# Patient Record
Sex: MALE | Race: BLACK OR AFRICAN AMERICAN | NOT HISPANIC OR LATINO | URBAN - METROPOLITAN AREA
[De-identification: names, ages, dates, MRNs, and addresses within clinical notes are randomized per-mention and may not be internally consistent; named-entity substitution may affect disease eponyms.]

---

## 2023-04-07 ENCOUNTER — OFFICE VISIT (OUTPATIENT)
Dept: PRIMARY CARE | Facility: CLINIC | Age: 43
End: 2023-04-07
Payer: COMMERCIAL

## 2023-04-07 VITALS
SYSTOLIC BLOOD PRESSURE: 132 MMHG | DIASTOLIC BLOOD PRESSURE: 88 MMHG | BODY MASS INDEX: 32.08 KG/M2 | HEART RATE: 79 BPM | RESPIRATION RATE: 12 BRPM | WEIGHT: 258 LBS | HEIGHT: 75 IN | OXYGEN SATURATION: 97 %

## 2023-04-07 DIAGNOSIS — Z70.8 ENCOUNTER FOR SEXUALLY TRANSMITTED DISEASE COUNSELING: Primary | ICD-10-CM

## 2023-04-07 PROBLEM — M25.641 STIFFNESS OF FINGER JOINT OF RIGHT HAND: Status: ACTIVE | Noted: 2023-04-07

## 2023-04-07 PROBLEM — M25.549 PAIN IN MULTIPLE FINGER JOINTS: Status: ACTIVE | Noted: 2023-04-07

## 2023-04-07 PROBLEM — S62.644A CLOSED NONDISPLACED FRACTURE OF PROXIMAL PHALANX OF RIGHT RING FINGER: Status: ACTIVE | Noted: 2023-04-07

## 2023-04-07 PROBLEM — S62.610D: Status: ACTIVE | Noted: 2023-04-07

## 2023-04-07 PROBLEM — R60.0 EDEMA OF HAND: Status: ACTIVE | Noted: 2023-04-07

## 2023-04-07 PROBLEM — S62.642A CLOSED NONDISPLACED FRACTURE OF PROXIMAL PHALANX OF RIGHT MIDDLE FINGER: Status: ACTIVE | Noted: 2023-04-07

## 2023-04-07 PROCEDURE — 99203 OFFICE O/P NEW LOW 30 MIN: CPT | Performed by: INTERNAL MEDICINE

## 2023-04-07 ASSESSMENT — PROMIS GLOBAL HEALTH SCALE
CARRYOUT_SOCIAL_ACTIVITIES: VERY GOOD
RATE_AVERAGE_FATIGUE: MILD
EMOTIONAL_PROBLEMS: RARELY
CARRYOUT_PHYSICAL_ACTIVITIES: COMPLETELY
RATE_MENTAL_HEALTH: VERY GOOD
RATE_AVERAGE_PAIN: 0
RATE_PHYSICAL_HEALTH: GOOD
RATE_QUALITY_OF_LIFE: GOOD
RATE_SOCIAL_SATISFACTION: VERY GOOD
RATE_GENERAL_HEALTH: GOOD

## 2023-04-07 NOTE — PROGRESS NOTES
"Subjective   Chief complaint: Marcelino Thomas is a 43 y.o. male who presents for New Patient Visit (Npv is here today to establish care. ).    HPI:  Patient would like to be checked for STDs and have a complete physical he does not have any complaint        Objective   /88 (BP Location: Right arm, Patient Position: Sitting, BP Cuff Size: Large adult)   Pulse 79   Resp 12   Ht 1.905 m (6' 3\")   Wt 117 kg (258 lb)   SpO2 97%   BMI 32.25 kg/m²   Physical Exam  Vitals reviewed.   Constitutional:       Appearance: Normal appearance.   HENT:      Head: Normocephalic and atraumatic.   Cardiovascular:      Rate and Rhythm: Normal rate and regular rhythm.   Pulmonary:      Effort: Pulmonary effort is normal.      Breath sounds: Normal breath sounds.   Abdominal:      General: Bowel sounds are normal.      Palpations: Abdomen is soft.   Musculoskeletal:      Cervical back: Neck supple.   Skin:     General: Skin is warm and dry.   Neurological:      General: No focal deficit present.      Mental Status: He is alert.   Psychiatric:         Mood and Affect: Mood normal.         Behavior: Behavior is cooperative.         I have reviewed and reconciled the medication list with the patient today. No current outpatient medications on file.     Imaging:  No results found.     Labs reviewed:    No results found for: WBC, HGB, HCT, PLT, CHOL, TRIG, HDL, LDLDIRECT, ALT, AST, NA, K, CL, CREATININE, BUN, CO2, TSH, PSA, INR, GLUF, HGBA1C, ALBUR    Assessment/Plan   Problem List Items Addressed This Visit    None      Continue current medications as listed  Follow up in 4 weeks for complete physical     "

## 2024-02-06 ENCOUNTER — TELEPHONE (OUTPATIENT)
Dept: PRIMARY CARE | Facility: CLINIC | Age: 44
End: 2024-02-06
Payer: COMMERCIAL

## 2024-02-06 DIAGNOSIS — Z00.00 PHYSICAL EXAM: Primary | ICD-10-CM

## 2024-02-07 ENCOUNTER — LAB (OUTPATIENT)
Dept: LAB | Facility: LAB | Age: 44
End: 2024-02-07
Payer: COMMERCIAL

## 2024-02-07 DIAGNOSIS — Z00.00 PHYSICAL EXAM: ICD-10-CM

## 2024-02-07 LAB
25(OH)D3 SERPL-MCNC: 8 NG/ML (ref 30–100)
ALBUMIN SERPL BCP-MCNC: 4.2 G/DL (ref 3.4–5)
ALP SERPL-CCNC: 75 U/L (ref 33–120)
ALT SERPL W P-5'-P-CCNC: 27 U/L (ref 10–52)
ANION GAP SERPL CALC-SCNC: 11 MMOL/L (ref 10–20)
AST SERPL W P-5'-P-CCNC: 22 U/L (ref 9–39)
BILIRUB SERPL-MCNC: 0.3 MG/DL (ref 0–1.2)
BUN SERPL-MCNC: 17 MG/DL (ref 6–23)
CALCIUM SERPL-MCNC: 10 MG/DL (ref 8.6–10.6)
CHLORIDE SERPL-SCNC: 106 MMOL/L (ref 98–107)
CHOLEST SERPL-MCNC: 174 MG/DL (ref 0–199)
CHOLESTEROL/HDL RATIO: 4.2
CO2 SERPL-SCNC: 30 MMOL/L (ref 21–32)
CREAT SERPL-MCNC: 1.79 MG/DL (ref 0.5–1.3)
EGFRCR SERPLBLD CKD-EPI 2021: 48 ML/MIN/1.73M*2
ERYTHROCYTE [DISTWIDTH] IN BLOOD BY AUTOMATED COUNT: 13.8 % (ref 11.5–14.5)
EST. AVERAGE GLUCOSE BLD GHB EST-MCNC: 123 MG/DL
GLUCOSE SERPL-MCNC: 94 MG/DL (ref 74–99)
HBA1C MFR BLD: 5.9 %
HCT VFR BLD AUTO: 44.3 % (ref 41–52)
HDLC SERPL-MCNC: 41.2 MG/DL
HGB BLD-MCNC: 13.7 G/DL (ref 13.5–17.5)
LDLC SERPL CALC-MCNC: 120 MG/DL
MCH RBC QN AUTO: 25.9 PG (ref 26–34)
MCHC RBC AUTO-ENTMCNC: 30.9 G/DL (ref 32–36)
MCV RBC AUTO: 84 FL (ref 80–100)
NON HDL CHOLESTEROL: 133 MG/DL (ref 0–149)
NRBC BLD-RTO: 0 /100 WBCS (ref 0–0)
PLATELET # BLD AUTO: 287 X10*3/UL (ref 150–450)
POTASSIUM SERPL-SCNC: 4.7 MMOL/L (ref 3.5–5.3)
PROT SERPL-MCNC: 7.3 G/DL (ref 6.4–8.2)
RBC # BLD AUTO: 5.29 X10*6/UL (ref 4.5–5.9)
SODIUM SERPL-SCNC: 142 MMOL/L (ref 136–145)
TRIGL SERPL-MCNC: 62 MG/DL (ref 0–149)
TSH SERPL-ACNC: 1.42 MIU/L (ref 0.44–3.98)
VLDL: 12 MG/DL (ref 0–40)
WBC # BLD AUTO: 4.1 X10*3/UL (ref 4.4–11.3)

## 2024-02-07 PROCEDURE — 83036 HEMOGLOBIN GLYCOSYLATED A1C: CPT

## 2024-02-07 PROCEDURE — 36415 COLL VENOUS BLD VENIPUNCTURE: CPT

## 2024-02-07 PROCEDURE — 84443 ASSAY THYROID STIM HORMONE: CPT

## 2024-02-07 PROCEDURE — 80061 LIPID PANEL: CPT

## 2024-02-07 PROCEDURE — 82306 VITAMIN D 25 HYDROXY: CPT

## 2024-02-07 PROCEDURE — 80053 COMPREHEN METABOLIC PANEL: CPT

## 2024-02-07 PROCEDURE — 85027 COMPLETE CBC AUTOMATED: CPT

## 2024-02-08 ENCOUNTER — APPOINTMENT (OUTPATIENT)
Dept: PRIMARY CARE | Facility: CLINIC | Age: 44
End: 2024-02-08
Payer: COMMERCIAL

## 2024-02-14 ENCOUNTER — OFFICE VISIT (OUTPATIENT)
Dept: PRIMARY CARE | Facility: CLINIC | Age: 44
End: 2024-02-14
Payer: COMMERCIAL

## 2024-02-14 VITALS
RESPIRATION RATE: 12 BRPM | WEIGHT: 269 LBS | DIASTOLIC BLOOD PRESSURE: 94 MMHG | SYSTOLIC BLOOD PRESSURE: 148 MMHG | OXYGEN SATURATION: 96 % | BODY MASS INDEX: 33.62 KG/M2 | HEART RATE: 97 BPM

## 2024-02-14 DIAGNOSIS — E55.9 VITAMIN D DEFICIENCY: ICD-10-CM

## 2024-02-14 DIAGNOSIS — M17.11 PRIMARY OSTEOARTHRITIS OF RIGHT KNEE: Primary | ICD-10-CM

## 2024-02-14 DIAGNOSIS — R94.31 ABNORMAL EKG: ICD-10-CM

## 2024-02-14 DIAGNOSIS — G44.229 CHRONIC TENSION-TYPE HEADACHE, NOT INTRACTABLE: ICD-10-CM

## 2024-02-14 DIAGNOSIS — E78.5 DYSLIPIDEMIA: ICD-10-CM

## 2024-02-14 DIAGNOSIS — Z00.00 ENCOUNTER FOR ANNUAL PHYSICAL EXAM: ICD-10-CM

## 2024-02-14 DIAGNOSIS — N18.32 STAGE 3B CHRONIC KIDNEY DISEASE (MULTI): ICD-10-CM

## 2024-02-14 DIAGNOSIS — R03.0 BORDERLINE BLOOD PRESSURE: ICD-10-CM

## 2024-02-14 PROBLEM — M25.641 STIFFNESS OF FINGER JOINT OF RIGHT HAND: Status: RESOLVED | Noted: 2023-04-07 | Resolved: 2024-02-14

## 2024-02-14 PROBLEM — R60.0 EDEMA OF HAND: Status: RESOLVED | Noted: 2023-04-07 | Resolved: 2024-02-14

## 2024-02-14 PROBLEM — H91.90 HEARING IMPAIRMENT: Status: ACTIVE | Noted: 2024-02-14

## 2024-02-14 PROBLEM — S62.644A CLOSED NONDISPLACED FRACTURE OF PROXIMAL PHALANX OF RIGHT RING FINGER: Status: RESOLVED | Noted: 2023-04-07 | Resolved: 2024-02-14

## 2024-02-14 PROBLEM — S62.642A CLOSED NONDISPLACED FRACTURE OF PROXIMAL PHALANX OF RIGHT MIDDLE FINGER: Status: RESOLVED | Noted: 2023-04-07 | Resolved: 2024-02-14

## 2024-02-14 PROBLEM — A59.9 TRICHOMONIASIS: Status: ACTIVE | Noted: 2024-02-14

## 2024-02-14 PROBLEM — H61.20 EXCESSIVE CERUMEN IN EAR CANAL: Status: ACTIVE | Noted: 2024-02-14

## 2024-02-14 PROBLEM — S62.610D: Status: RESOLVED | Noted: 2023-04-07 | Resolved: 2024-02-14

## 2024-02-14 PROBLEM — A59.9 TRICHOMONIASIS: Status: RESOLVED | Noted: 2024-02-14 | Resolved: 2024-02-14

## 2024-02-14 PROBLEM — L72.9 CYST OF SKIN: Status: ACTIVE | Noted: 2024-02-14

## 2024-02-14 PROCEDURE — 93000 ELECTROCARDIOGRAM COMPLETE: CPT | Performed by: INTERNAL MEDICINE

## 2024-02-14 PROCEDURE — 99396 PREV VISIT EST AGE 40-64: CPT | Performed by: INTERNAL MEDICINE

## 2024-02-14 PROCEDURE — 99214 OFFICE O/P EST MOD 30 MIN: CPT | Performed by: INTERNAL MEDICINE

## 2024-02-14 RX ORDER — IBUPROFEN 800 MG/1
800 TABLET ORAL 3 TIMES DAILY PRN
COMMUNITY
Start: 2023-12-20 | End: 2024-02-14 | Stop reason: SINTOL

## 2024-02-14 RX ORDER — ACETAMINOPHEN 325 MG/1
650 TABLET ORAL EVERY 6 HOURS PRN
COMMUNITY

## 2024-02-14 RX ORDER — OMEGA-3S/DHA/EPA/FISH OIL/D3 300MG-1000
CAPSULE ORAL 3 TIMES DAILY
COMMUNITY
End: 2024-02-26 | Stop reason: SDUPTHER

## 2024-02-14 RX ORDER — ERGOCALCIFEROL 1.25 MG/1
50000 CAPSULE ORAL
COMMUNITY
End: 2024-02-26 | Stop reason: SDUPTHER

## 2024-02-14 ASSESSMENT — PROMIS GLOBAL HEALTH SCALE
EMOTIONAL_PROBLEMS: RARELY
CARRYOUT_PHYSICAL_ACTIVITIES: COMPLETELY
RATE_GENERAL_HEALTH: GOOD
CARRYOUT_SOCIAL_ACTIVITIES: VERY GOOD
RATE_AVERAGE_PAIN: 5
RATE_PHYSICAL_HEALTH: GOOD
RATE_AVERAGE_FATIGUE: MODERATE
RATE_QUALITY_OF_LIFE: VERY GOOD
RATE_MENTAL_HEALTH: VERY GOOD
RATE_SOCIAL_SATISFACTION: VERY GOOD

## 2024-02-14 ASSESSMENT — ENCOUNTER SYMPTOMS
OCCASIONAL FEELINGS OF UNSTEADINESS: 0
LOSS OF SENSATION IN FEET: 1
DEPRESSION: 0

## 2024-02-14 ASSESSMENT — PATIENT HEALTH QUESTIONNAIRE - PHQ9
1. LITTLE INTEREST OR PLEASURE IN DOING THINGS: NOT AT ALL
2. FEELING DOWN, DEPRESSED OR HOPELESS: NOT AT ALL
SUM OF ALL RESPONSES TO PHQ9 QUESTIONS 1 AND 2: 0

## 2024-02-14 NOTE — ASSESSMENT & PLAN NOTE
Chol 174, .   Discussed dietary changes.   Discussed taking omega 3 with meals.     Follow-up in 6 mo.

## 2024-02-14 NOTE — ASSESSMENT & PLAN NOTE
Discussed lab results.   Discussed EKG ordered echo for further evaluation of left axis deviation.   Encouraged vision testing.   Consulted on healthy diet and active lifestyle.

## 2024-02-14 NOTE — ASSESSMENT & PLAN NOTE
/94. Retake 128/82.   Control kidney function. Monitor BP 2 times daily at home, bring log in two weeks.     Check in office in 2 wks.

## 2024-02-14 NOTE — ASSESSMENT & PLAN NOTE
Kidney US for further evaluation. No labs for comparison.   Discontinue ibuprofen products. Drink plenty of water. Patient understands.     Follow-up in 2 wks for BMP

## 2024-02-14 NOTE — PROGRESS NOTES
ANNUAL WELLNESS VISIT    Subjective :  Chief Complaint: Marcelino Thomas is an 43 y.o. male here for an annual wellness visit and general medical care and f/u.     HPI:  Marcelino is here for annual physical.     Headaches have been happening for about 1 yr. The past 2 wks every day. He reports they get better with adequate water intake and when he is eating healthier. He takes ibuprofen 800mg 3 times weekly for them.   Denies aura, denies sudden onset.     Endorses SOB with exertion lately. No CP, SOB at rest.     Needs vision check.     Follows regularly with dental.     Cigar smoker, but not currently.   2 drinks /week        Objective   BP (!) 148/94   Pulse 97   Resp 12   Wt 122 kg (269 lb)   SpO2 96%   BMI 33.62 kg/m²     Physical Exam  Vitals reviewed.   Constitutional:       Appearance: Normal appearance.   Cardiovascular:      Rate and Rhythm: Normal rate and regular rhythm.   Pulmonary:      Effort: Pulmonary effort is normal.      Breath sounds: Normal breath sounds.   Abdominal:      General: Abdomen is flat. Bowel sounds are normal.      Palpations: Abdomen is soft.   Neurological:      Mental Status: He is alert and oriented to person, place, and time.   Psychiatric:         Mood and Affect: Mood normal.         Behavior: Behavior normal.         Thought Content: Thought content normal.         Judgment: Judgment normal.         Imaging:  No results found.     Labs reviewed:    Lab Results   Component Value Date    WBC 4.1 (L) 02/07/2024    HGB 13.7 02/07/2024    HCT 44.3 02/07/2024     02/07/2024    CHOL 174 02/07/2024    TRIG 62 02/07/2024    HDL 41.2 02/07/2024    ALT 27 02/07/2024    AST 22 02/07/2024     02/07/2024    K 4.7 02/07/2024     02/07/2024    CREATININE 1.79 (H) 02/07/2024    BUN 17 02/07/2024    CO2 30 02/07/2024    TSH 1.42 02/07/2024    HGBA1C 5.9 (H) 02/07/2024       Past Medical, Surgical, and Family History reviewed and updated in chart.    I have reviewed and  reconciled the medication list with the patient today.   Current Outpatient Medications:     ergocalciferol (Vitamin D-2) 1.25 MG (95696 UT) capsule, Take 1 capsule (50,000 Units) by mouth 1 (one) time per week., Disp: , Rfl:     omega-3s-dha-epa-fish oil 1,000-1,400 mg capsule, Take by mouth 3 times a day. Take with each meal, Disp: , Rfl:     acetaminophen (Tylenol) 325 mg tablet, Take 2 tablets (650 mg) by mouth every 6 hours if needed for mild pain (1 - 3)., Disp: , Rfl:      List of current healthcare providers:  Patient Care Team:  Manuel Henderson MD as PCP - General (Internal Medicine)  Manuel Henderson MD as PCP - [facility] PCP     HRA:  Over the past 2 weeks, how often have you been bothered by any of the following problems?  Little interest or pleasure in doing things: Not at all  Feeling down, depressed, or hopeless: Not at all  Patient Health Questionnaire-2 Score: 0    Steadi Fall Risk  One or more falls in the last year? No  How many Times?    Was the patient injured in the fall?    Has trouble stepping onto curb? No  Advised to use a cane or walker to get around safely? No  Often has to rush to toilet? No  Feels unsteady when walking? No  Has lost some feeling in feet? Yes  Often feels sad or depressed? No  Steadies self on furniture while walking at home? No  Takes medicine that makes them feel lightheaded or more tired than usual? No  Worried about Falling? No  Takes medicine to sleep or improve mood? No  Needs to push with hands when rising from a chair? No                                          Assessment/Plan :  Problem List Items Addressed This Visit       Encounter for annual physical exam     Discussed lab results.   Discussed EKG ordered echo for further evaluation of left axis deviation.   Encouraged vision testing.   Consulted on healthy diet and active lifestyle.          Relevant Orders    ECG 12 lead (Clinic Performed)    Primary osteoarthritis of right knee - Primary     No ibuprofen  products. Tylenol as needed for pain.          Abnormal EKG    Relevant Orders    Transthoracic Echo (TTE) Complete    Creatinine elevation     Creatinine 1.79, eGFR 48         Relevant Orders    US renal complete    Stage 3b chronic kidney disease (CMS/HCC)     Kidney US for further evaluation. No labs for comparison.   Discontinue ibuprofen products. Drink plenty of water. Patient understands.     Follow-up in 2 wks for BMP         Chronic tension-type headache, not intractable     Stop taking ibuprofen. Drink plenty of water and getting adequate rest.          Borderline blood pressure     /94. Retake 128/82.   Control kidney function. Monitor BP 2 times daily at home, bring log in two weeks.     Check in office in 2 wks.          Dyslipidemia     Chol 174, .   Discussed dietary changes.   Discussed taking omega 3 with meals.     Follow-up in 6 mo.          Vitamin D deficiency     Level 8.   Start Vitamin D 50,000 units weekly.     Recheck in 3 mo.           The following health maintenance schedule was reviewed with the patient and provided in printed form in the after visit summary:  Health Maintenance   Topic Date Due    Hepatitis B Vaccines (1 of 3 - 3-dose series) Never done    COVID-19 Vaccine (1) Never done    MMR Vaccines (1 of 1 - Standard series) Never done    Pneumococcal Vaccine: Pediatrics (0 to 5 Years) and At-Risk Patients (6 to 64 Years) (1 - PCV) Never done    Hepatitis C Screening  Never done    DTaP/Tdap/Td Vaccines (1 - Tdap) Never done    Influenza Vaccine (1) 06/30/2024 (Originally 9/1/2023)    Diabetes: Hemoglobin A1C  02/07/2025    Yearly Adult Physical  02/15/2025    Lipid Panel  02/07/2029    Zoster Vaccines (1 of 2) 02/22/2030    HIV Screening  Completed    HIB Vaccines  Aged Out    IPV Vaccines  Aged Out    Hepatitis A Vaccines  Aged Out    Meningococcal Vaccine  Aged Out    Rotavirus Vaccines  Aged Out    HPV Vaccines  Aged Out       Advance Care Planning   No              Orders Placed This Encounter   Procedures    US renal complete     Standing Status:   Future     Standing Expiration Date:   2/14/2025     Order Specific Question:   Reason for exam:     Answer:   elevated creatine     Order Specific Question:   Radiologist to Determine Optimal Study     Answer:   Yes     Order Specific Question:   [facility] to [facility]     Answer:   Immediate [1]     Order Specific Question:   Is this exam part of a Research Study? If Yes, link this order to the research study     Answer:   No    ECG 12 lead (Clinic Performed)    Transthoracic Echo (TTE) Complete     Standing Status:   Future     Standing Expiration Date:   2/14/2025     Order Specific Question:   Reason for exam:     Answer:   abnormal EKG     Order Specific Question:   Possible 3D echo?     Answer:   No     Order Specific Question:   Possible strain echo?     Answer:   No     Order Specific Question:   Where is your preferred location to perform this study?     Answer:   First Available     Order Specific Question:   Is this procedure part of a Research Study? If Yes, link this order to the research study     Answer:   No       Continue current medications as listed  Follow up in 2 wks. BMP and BP check.  Follow-up in 3 mo for vitamin D and CMP

## 2024-02-15 DIAGNOSIS — R03.0 BORDERLINE BLOOD PRESSURE: ICD-10-CM

## 2024-02-15 DIAGNOSIS — N18.32 STAGE 3B CHRONIC KIDNEY DISEASE (MULTI): ICD-10-CM

## 2024-02-23 ENCOUNTER — LAB (OUTPATIENT)
Dept: LAB | Facility: LAB | Age: 44
End: 2024-02-23
Payer: COMMERCIAL

## 2024-02-23 ENCOUNTER — HOSPITAL ENCOUNTER (OUTPATIENT)
Dept: RADIOLOGY | Facility: HOSPITAL | Age: 44
Discharge: HOME | End: 2024-02-23
Payer: COMMERCIAL

## 2024-02-23 DIAGNOSIS — N18.32 STAGE 3B CHRONIC KIDNEY DISEASE (MULTI): ICD-10-CM

## 2024-02-23 DIAGNOSIS — R03.0 BORDERLINE BLOOD PRESSURE: ICD-10-CM

## 2024-02-23 LAB
ANION GAP SERPL CALC-SCNC: 13 MMOL/L (ref 10–20)
BUN SERPL-MCNC: 17 MG/DL (ref 6–23)
CALCIUM SERPL-MCNC: 10.1 MG/DL (ref 8.6–10.6)
CHLORIDE SERPL-SCNC: 107 MMOL/L (ref 98–107)
CO2 SERPL-SCNC: 27 MMOL/L (ref 21–32)
CREAT SERPL-MCNC: 1.64 MG/DL (ref 0.5–1.3)
EGFRCR SERPLBLD CKD-EPI 2021: 53 ML/MIN/1.73M*2
GLUCOSE SERPL-MCNC: 99 MG/DL (ref 74–99)
POTASSIUM SERPL-SCNC: 4.2 MMOL/L (ref 3.5–5.3)
SODIUM SERPL-SCNC: 143 MMOL/L (ref 136–145)

## 2024-02-23 PROCEDURE — 76770 US EXAM ABDO BACK WALL COMP: CPT

## 2024-02-23 PROCEDURE — 76770 US EXAM ABDO BACK WALL COMP: CPT | Performed by: RADIOLOGY

## 2024-02-23 PROCEDURE — 36415 COLL VENOUS BLD VENIPUNCTURE: CPT

## 2024-02-23 PROCEDURE — 80048 BASIC METABOLIC PNL TOTAL CA: CPT

## 2024-02-26 ENCOUNTER — OFFICE VISIT (OUTPATIENT)
Dept: PRIMARY CARE | Facility: CLINIC | Age: 44
End: 2024-02-26
Payer: COMMERCIAL

## 2024-02-26 VITALS
SYSTOLIC BLOOD PRESSURE: 126 MMHG | HEART RATE: 104 BPM | DIASTOLIC BLOOD PRESSURE: 84 MMHG | OXYGEN SATURATION: 95 % | WEIGHT: 269 LBS | RESPIRATION RATE: 12 BRPM | HEIGHT: 75 IN | BODY MASS INDEX: 33.45 KG/M2

## 2024-02-26 DIAGNOSIS — M17.11 PRIMARY OSTEOARTHRITIS OF RIGHT KNEE: ICD-10-CM

## 2024-02-26 DIAGNOSIS — E78.5 DYSLIPIDEMIA: ICD-10-CM

## 2024-02-26 DIAGNOSIS — N18.32 STAGE 3B CHRONIC KIDNEY DISEASE (MULTI): Primary | ICD-10-CM

## 2024-02-26 DIAGNOSIS — L72.9 CYST OF SKIN: ICD-10-CM

## 2024-02-26 DIAGNOSIS — E55.9 VITAMIN D DEFICIENCY: ICD-10-CM

## 2024-02-26 DIAGNOSIS — N18.32 STAGE 3B CHRONIC KIDNEY DISEASE (MULTI): ICD-10-CM

## 2024-02-26 DIAGNOSIS — Z00.00 ENCOUNTER FOR ANNUAL PHYSICAL EXAM: ICD-10-CM

## 2024-02-26 DIAGNOSIS — G44.229 CHRONIC TENSION-TYPE HEADACHE, NOT INTRACTABLE: ICD-10-CM

## 2024-02-26 LAB
POC APPEARANCE, URINE: CLEAR
POC BILIRUBIN, URINE: NEGATIVE
POC BLOOD, URINE: NEGATIVE
POC COLOR, URINE: ABNORMAL
POC GLUCOSE, URINE: NEGATIVE MG/DL
POC KETONES, URINE: NEGATIVE MG/DL
POC LEUKOCYTES, URINE: NEGATIVE
POC NITRITE,URINE: NEGATIVE
POC PH, URINE: 5 PH
POC PROTEIN, URINE: NEGATIVE MG/DL
POC SPECIFIC GRAVITY, URINE: 1.02
POC UROBILINOGEN, URINE: 0.2 EU/DL

## 2024-02-26 PROCEDURE — 81002 URINALYSIS NONAUTO W/O SCOPE: CPT | Performed by: INTERNAL MEDICINE

## 2024-02-26 PROCEDURE — 93000 ELECTROCARDIOGRAM COMPLETE: CPT | Performed by: INTERNAL MEDICINE

## 2024-02-26 PROCEDURE — 99214 OFFICE O/P EST MOD 30 MIN: CPT | Performed by: INTERNAL MEDICINE

## 2024-02-26 PROCEDURE — 99396 PREV VISIT EST AGE 40-64: CPT | Performed by: INTERNAL MEDICINE

## 2024-02-26 RX ORDER — OMEGA-3S/DHA/EPA/FISH OIL/D3 300MG-1000
1 CAPSULE ORAL 3 TIMES DAILY
Qty: 90 CAPSULE | Refills: 3 | Status: SHIPPED | OUTPATIENT
Start: 2024-02-26

## 2024-02-26 RX ORDER — ERGOCALCIFEROL 1.25 MG/1
50000 CAPSULE ORAL
Qty: 12 CAPSULE | Refills: 3 | Status: SHIPPED | OUTPATIENT
Start: 2024-02-26

## 2024-02-26 ASSESSMENT — ENCOUNTER SYMPTOMS
DEPRESSION: 0
OCCASIONAL FEELINGS OF UNSTEADINESS: 0
LOSS OF SENSATION IN FEET: 0

## 2024-02-26 ASSESSMENT — PATIENT HEALTH QUESTIONNAIRE - PHQ9
SUM OF ALL RESPONSES TO PHQ9 QUESTIONS 1 AND 2: 0
1. LITTLE INTEREST OR PLEASURE IN DOING THINGS: NOT AT ALL
2. FEELING DOWN, DEPRESSED OR HOPELESS: NOT AT ALL

## 2024-02-26 NOTE — ASSESSMENT & PLAN NOTE
No ibuprofen products. Tylenol as needed for pain. No ibuprofen products. Tylenol as needed for pain.

## 2024-02-26 NOTE — ASSESSMENT & PLAN NOTE
Chol 174, .   Discussed dietary changes.   Discussed taking omega 3 with meals.   Follow-up in 3mo. check lipid profile

## 2024-02-26 NOTE — PROGRESS NOTES
"ANNUAL WELLNESS VISIT    Subjective :  Chief Complaint: Marcelino Thomas is an 44 y.o. male here for an annual wellness visit and general medical care and f/u.     HPI:  Annual wellness exam        Objective   /84   Pulse 104   Resp 12   Ht 1.905 m (6' 3\")   Wt 122 kg (269 lb)   SpO2 95%   BMI 33.62 kg/m²     Physical Exam  Vitals reviewed.   Constitutional:       Appearance: Normal appearance.   HENT:      Head: Normocephalic and atraumatic.   Cardiovascular:      Rate and Rhythm: Normal rate and regular rhythm.   Pulmonary:      Effort: Pulmonary effort is normal.      Breath sounds: Normal breath sounds.   Abdominal:      General: Bowel sounds are normal.      Palpations: Abdomen is soft.   Musculoskeletal:      Cervical back: Neck supple.   Skin:     General: Skin is warm and dry.   Neurological:      General: No focal deficit present.      Mental Status: He is alert.   Psychiatric:         Mood and Affect: Mood normal.         Behavior: Behavior is cooperative.         Imaging:  US renal complete    Result Date: 2/23/2024  Interpreted By:  Twan Espinoza, STUDY: US RENAL COMPLETE;  2/23/2024 3:10 pm   INDICATION: Signs/Symptoms:elevated creatine.   COMPARISON: None.   ACCESSION NUMBER(S): [id number]   ORDERING CLINICIAN: JESSIE SMITH   TECHNIQUE: Multiple images of the kidneys were obtained  , with color Doppler for blood flow.  It is noted the exam was limited due to some interfering bowel gas..   FINDINGS: RIGHT KIDNEY: The right kidney measures 10.9 cm in length.  The renal cortex is within normal limits.   No hydronephrosis or evidence of nephrolithiasis. Color Doppler demonstrates renal blood flow.   LEFT KIDNEY: The left kidney measures 10.2 cm in length. The renal cortex is within normal limits.    No hydronephrosis or evidence of nephrolithiasis.. Color Doppler demonstrates renal blood flow.   BLADDER: The bladder was underdistended, not well evaluated but grossly unremarkable. Bilateral " ureteral jets however were evident.   OTHER FINDINGS: The prostate measures 2.7 x 2.8 x 3.2 cm, with a volume of 12.8 mL.       Unremarkable renal ultrasound. Suboptimal evaluation of the bladder.   Signed by: Twan Espinoza 2/23/2024 6:07 PM Dictation workstation:   [id number]    ECG 12 lead (Clinic Performed)    Result Date: 2/14/2024  Left bundle branch block Repolarization Sinus rhythm       Labs reviewed:    Lab Results   Component Value Date    WBC 4.1 (L) 02/07/2024    HGB 13.7 02/07/2024    HCT 44.3 02/07/2024     02/07/2024    CHOL 174 02/07/2024    TRIG 62 02/07/2024    HDL 41.2 02/07/2024    ALT 27 02/07/2024    AST 22 02/07/2024     02/23/2024    K 4.2 02/23/2024     02/23/2024    CREATININE 1.64 (H) 02/23/2024    BUN 17 02/23/2024    CO2 27 02/23/2024    TSH 1.42 02/07/2024    HGBA1C 5.9 (H) 02/07/2024       Past Medical, Surgical, and Family History reviewed and updated in chart.    I have reviewed and reconciled the medication list with the patient today.   Current Outpatient Medications:     acetaminophen (Tylenol) 325 mg tablet, Take 2 tablets (650 mg) by mouth every 6 hours if needed for mild pain (1 - 3)., Disp: , Rfl:     ergocalciferol (Vitamin D-2) 1.25 MG (76369 UT) capsule, Take 1 capsule (50,000 Units) by mouth 1 (one) time per week., Disp: , Rfl:     omega-3s-dha-epa-fish oil 1,000-1,400 mg capsule, Take by mouth 3 times a day. Take with each meal, Disp: , Rfl:      List of current healthcare providers:  Patient Care Team:  Manuel Henderson MD as PCP - General (Internal Medicine)     HRA:  Over the past 2 weeks, how often have you been bothered by any of the following problems?  Little interest or pleasure in doing things: Not at all  Feeling down, depressed, or hopeless: Not at all  Patient Health Questionnaire-2 Score: 0    Steadi Fall Risk  One or more falls in the last year? No  How many Times?    Was the patient injured in the fall?    Has trouble stepping onto curb?  No  Advised to use a cane or walker to get around safely? No  Often has to rush to toilet? No  Feels unsteady when walking? No  Has lost some feeling in feet? No  Often feels sad or depressed? No  Steadies self on furniture while walking at home? No  Takes medicine that makes them feel lightheaded or more tired than usual? No  Worried about Falling? No  Takes medicine to sleep or improve mood? No  Needs to push with hands when rising from a chair? No                                          Assessment/Plan :  Problem List Items Addressed This Visit       Encounter for annual physical exam    Relevant Orders    POCT UA (nonautomated) manually resulted (Completed)    ECG 12 lead (Clinic Performed)    Cyst of skin    Primary osteoarthritis of right knee     No ibuprofen products. Tylenol as needed for pain. No ibuprofen products. Tylenol as needed for pain.          Creatinine elevation     Normal ultrasound of the kidney  Creatinine is slightly better  Referred to nephrology         Stage 3b chronic kidney disease (CMS/[facility]) - Primary     Kidney US for further evaluation. No labs for comparison.   Discontinue ibuprofen products. Drink plenty of water. Patient understands.   Referral to nephrology  Follow-up in 2 wks for BMP         Chronic tension-type headache, not intractable     Most likely secondary to muscle straining in his neck area  No anti-inflammatory  Only Tylenol for the pain  Consider muscle relaxer.         Dyslipidemia     Chol 174, .   Discussed dietary changes.   Discussed taking omega 3 with meals.   Follow-up in 3mo. check lipid profile         Vitamin D deficiency     The following health maintenance schedule was reviewed with the patient and provided in printed form in the after visit summary:  Health Maintenance   Topic Date Due    COVID-19 Vaccine (1) 05/25/2024 (Originally 1980)    Influenza Vaccine (1) 06/30/2024 (Originally 9/1/2023)    Diabetes: Hemoglobin A1C  02/07/2025     Yearly Adult Physical  02/15/2025    Lipid Panel  02/07/2029    Zoster Vaccines (1 of 2) 02/22/2030    HIV Screening  Completed    HIB Vaccines  Aged Out    IPV Vaccines  Aged Out    Hepatitis A Vaccines  Aged Out    Meningococcal Vaccine  Aged Out    Rotavirus Vaccines  Aged Out    HPV Vaccines  Aged Out    DTaP/Tdap/Td Vaccines  Discontinued    Hepatitis B Vaccines  Discontinued    MMR Vaccines  Discontinued    Pneumococcal Vaccine: Pediatrics (0 to 5 Years) and At-Risk Patients (6 to 64 Years)  Discontinued    Hepatitis C Screening  Discontinued       Advance Care Planning                Orders Placed This Encounter   Procedures    POCT UA (nonautomated) manually resulted     Order Specific Question:   Release result to [employer]     Answer:   Immediate [1]    ECG 12 lead (Clinic Performed)       Continue current medications as listed  Follow up in 3 months check lipid profile CMP CBC vitamin D

## 2024-02-26 NOTE — ASSESSMENT & PLAN NOTE
Most likely secondary to muscle straining in his neck area  No anti-inflammatory  Only Tylenol for the pain  Consider muscle relaxer.

## 2024-02-26 NOTE — ASSESSMENT & PLAN NOTE
Kidney US for further evaluation. No labs for comparison.   Discontinue ibuprofen products. Drink plenty of water. Patient understands.   Referral to nephrology  Follow-up in 2 wks for BMP

## 2024-03-11 ENCOUNTER — HOSPITAL ENCOUNTER (OUTPATIENT)
Dept: CARDIOLOGY | Facility: CLINIC | Age: 44
Discharge: HOME | End: 2024-03-11
Payer: COMMERCIAL

## 2024-03-11 DIAGNOSIS — R94.31 ABNORMAL EKG: ICD-10-CM

## 2024-03-11 LAB
AORTIC VALVE PEAK VELOCITY: 1.11 M/S
AV PEAK GRADIENT: 4.9 MMHG
AVA (PEAK VEL): 4.04 CM2
EJECTION FRACTION APICAL 4 CHAMBER: 56.2
EJECTION FRACTION: 60 %
LEFT ATRIUM VOLUME AREA LENGTH INDEX BSA: 19.5 ML/M2
LEFT VENTRICLE INTERNAL DIMENSION DIASTOLE: 5.02 CM (ref 3.5–6)
LEFT VENTRICULAR OUTFLOW TRACT DIAMETER: 2.56 CM
MITRAL VALVE E/A RATIO: 1.27
MITRAL VALVE E/E' RATIO: 9.36
RIGHT VENTRICLE FREE WALL PEAK S': 16.5 CM/S
TRICUSPID ANNULAR PLANE SYSTOLIC EXCURSION: 2.5 CM

## 2024-03-11 PROCEDURE — 93306 TTE W/DOPPLER COMPLETE: CPT | Performed by: INTERNAL MEDICINE

## 2024-03-11 PROCEDURE — 93306 TTE W/DOPPLER COMPLETE: CPT

## 2024-04-03 ENCOUNTER — APPOINTMENT (OUTPATIENT)
Dept: PRIMARY CARE | Facility: CLINIC | Age: 44
End: 2024-04-03
Payer: COMMERCIAL

## 2024-04-12 ENCOUNTER — APPOINTMENT (OUTPATIENT)
Dept: PRIMARY CARE | Facility: CLINIC | Age: 44
End: 2024-04-12
Payer: COMMERCIAL

## 2024-06-24 ENCOUNTER — APPOINTMENT (OUTPATIENT)
Dept: OPHTHALMOLOGY | Facility: CLINIC | Age: 44
End: 2024-06-24
Payer: COMMERCIAL